# Patient Record
Sex: FEMALE | Race: WHITE | NOT HISPANIC OR LATINO | Employment: PART TIME | URBAN - METROPOLITAN AREA
[De-identification: names, ages, dates, MRNs, and addresses within clinical notes are randomized per-mention and may not be internally consistent; named-entity substitution may affect disease eponyms.]

---

## 2019-07-02 ENCOUNTER — APPOINTMENT (EMERGENCY)
Dept: RADIOLOGY | Facility: HOSPITAL | Age: 29
End: 2019-07-02
Payer: COMMERCIAL

## 2019-07-02 ENCOUNTER — HOSPITAL ENCOUNTER (EMERGENCY)
Facility: HOSPITAL | Age: 29
Discharge: HOME | End: 2019-07-02
Attending: EMERGENCY MEDICINE
Payer: COMMERCIAL

## 2019-07-02 VITALS
HEART RATE: 86 BPM | TEMPERATURE: 98.9 F | DIASTOLIC BLOOD PRESSURE: 62 MMHG | WEIGHT: 220.02 LBS | SYSTOLIC BLOOD PRESSURE: 128 MMHG | OXYGEN SATURATION: 98 % | RESPIRATION RATE: 18 BRPM

## 2019-07-02 DIAGNOSIS — T14.8XXA ABRASION: ICD-10-CM

## 2019-07-02 DIAGNOSIS — S62.91XA HAND FRACTURE, RIGHT: Primary | ICD-10-CM

## 2019-07-02 PROCEDURE — 99283 EMERGENCY DEPT VISIT LOW MDM: CPT

## 2019-07-02 PROCEDURE — 99283 EMERGENCY DEPT VISIT LOW MDM: CPT | Performed by: PHYSICIAN ASSISTANT

## 2019-07-02 PROCEDURE — 90715 TDAP VACCINE 7 YRS/> IM: CPT | Performed by: PHYSICIAN ASSISTANT

## 2019-07-02 PROCEDURE — 90471 IMMUNIZATION ADMIN: CPT

## 2019-07-02 PROCEDURE — 73130 X-RAY EXAM OF HAND: CPT

## 2019-07-02 RX ORDER — TRAMADOL HYDROCHLORIDE 50 MG/1
50 TABLET ORAL EVERY 6 HOURS PRN
Qty: 10 TABLET | Refills: 0 | Status: SHIPPED | OUTPATIENT
Start: 2019-07-02

## 2019-07-02 RX ORDER — IBUPROFEN 400 MG/1
400 TABLET ORAL ONCE
Status: COMPLETED | OUTPATIENT
Start: 2019-07-02 | End: 2019-07-02

## 2019-07-02 RX ADMIN — IBUPROFEN 400 MG: 400 TABLET ORAL at 10:15

## 2019-07-02 RX ADMIN — TETANUS TOXOID, REDUCED DIPHTHERIA TOXOID AND ACELLULAR PERTUSSIS VACCINE, ADSORBED 0.5 ML: 5; 2.5; 8; 8; 2.5 SUSPENSION INTRAMUSCULAR at 10:49

## 2019-07-02 NOTE — ED PROVIDER NOTES
History  Chief Complaint   Patient presents with    Hand Injury     cast iron pipe fell on right hand yesterday  This is a 58-year-old female patient who stated that she had a cast iron pipe fall on her right hand she has pain over 5th metacarpal   Right at the base  Mild swelling full range of motion no crepitus deformity no numbness or tingling she is right-hand dominant  She was wearing a ring on her right ring finger right due to swelling I did remove the ring with lube  Patient has no other complaints she will have an x-ray to rule out a fracture  I did notice that she does have a scrape over her left forearm she states from a reciprocating saw  It does not appear infected last tetanus is unknown she will have 1 today  None       History reviewed  No pertinent past medical history  History reviewed  No pertinent surgical history  History reviewed  No pertinent family history  I have reviewed and agree with the history as documented  Social History     Tobacco Use    Smoking status: Current Every Day Smoker     Packs/day: 0 50    Smokeless tobacco: Never Used   Substance Use Topics    Alcohol use: Yes     Comment: social    Drug use: Never        Review of Systems   All other systems reviewed and are negative  Physical Exam  Physical Exam   Constitutional: She appears well-developed and well-nourished  HENT:   Head: Normocephalic and atraumatic  Right Ear: External ear normal    Left Ear: External ear normal    Nose: Nose normal    Mouth/Throat: Oropharynx is clear and moist    Eyes: Pupils are equal, round, and reactive to light  Conjunctivae are normal    Neck: Normal range of motion  Neck supple  Cardiovascular: Normal rate and regular rhythm  Pulmonary/Chest: Effort normal and breath sounds normal    Abdominal: Soft  Bowel sounds are normal  There is no tenderness  Musculoskeletal: Normal range of motion  Arms:       Hands:  Neurological: She is alert  Skin: Skin is warm  Psychiatric: She has a normal mood and affect  Her behavior is normal    Nursing note and vitals reviewed  Vital Signs  ED Triage Vitals [07/02/19 0955]   Temperature Pulse Respirations Blood Pressure SpO2   98 9 °F (37 2 °C) 86 18 128/62 98 %      Temp Source Heart Rate Source Patient Position - Orthostatic VS BP Location FiO2 (%)   Temporal Monitor Sitting Left arm --      Pain Score       4           Vitals:    07/02/19 0955   BP: 128/62   Pulse: 86   Patient Position - Orthostatic VS: Sitting         Visual Acuity      ED Medications  Medications   tetanus-diphtheria-acellular pertussis (BOOSTRIX) IM injection 0 5 mL (has no administration in time range)   ibuprofen (MOTRIN) tablet 400 mg (400 mg Oral Given 7/2/19 1015)       Diagnostic Studies  Results Reviewed     None                 XR hand 3+ views RIGHT   ED Interpretation by Bienvenido Dickerson PA-C (07/02 1014)   Fracture 5th met base      Final Result by Mady Cardenas DO (07/02 1025)      Comminuted, nondisplaced fracture base of the 5th metacarpal       As per comments in EPIC, findings are concordant with preliminary interpretation provided by the emergency department  Workstation performed: [id number]                    Procedures  Procedures       ED Course  ED Course as of Jul 02 1034   Tue Jul 02, 2019   3518 Splint application:  Ulnar gutter Splint was applied, Applied by technician, good position, neurovascular tendon intact, good capillary refill  Evaluated by me prior to discharge  MDM    Disposition  Final diagnoses:   Hand fracture, right   Abrasion     Time reflects when diagnosis was documented in both MDM as applicable and the Disposition within this note     Time User Action Codes Description Comment    7/2/2019 10:27 AM Jean Ferrell Add [S62 [id number]] Hand fracture, right     7/2/2019 10:27 AM Jean Ferrell Add [T14  [address] Abrasion       ED Disposition     ED Disposition Condition Date/Time Comment    Discharge Stable Tue Jul 2, 2019 10:26 AM Loyce Shanna Stem discharge to home/self care  Follow-up Information     Follow up With Specialties Details Why Contact Info Additional [address] [facility] Schedule an appointment as soon as possible for a visit   [address]  [phone]  [address] [place] [address], [address] [place] [address],  [facility], [place], [place], [address]          Patient's Medications   Discharge Prescriptions    DICLOFENAC SODIUM (VOLTAREN) 50 MG EC TABLET    Take 1 tablet (50 mg total) by mouth 2 (two) times a day for 5 days       Start Date: 7/2/2019  End Date: 7/7/2019       Order Dose: 50 mg       Quantity: 10 tablet    Refills: 0    TRAMADOL (ULTRAM) 50 MG TABLET    Take 1 tablet (50 mg total) by mouth every 6 (six) hours as needed for moderate pain       Start Date: 7/2/2019  End Date: --       Order Dose: 50 mg       Quantity: 10 tablet    Refills: 0     No discharge procedures on file      ED Provider  Electronically Signed by           Bal Mukherjee PA-C  07/02/19 8510